# Patient Record
Sex: FEMALE
[De-identification: names, ages, dates, MRNs, and addresses within clinical notes are randomized per-mention and may not be internally consistent; named-entity substitution may affect disease eponyms.]

---

## 2018-03-08 ENCOUNTER — TRANSCRIPTION ENCOUNTER (OUTPATIENT)
Age: 33
End: 2018-03-08

## 2020-06-24 ENCOUNTER — RESULT REVIEW (OUTPATIENT)
Age: 35
End: 2020-06-24

## 2021-04-29 ENCOUNTER — RESULT REVIEW (OUTPATIENT)
Age: 36
End: 2021-04-29

## 2022-08-15 ENCOUNTER — RESULT REVIEW (OUTPATIENT)
Age: 37
End: 2022-08-15

## 2022-11-07 PROBLEM — Z00.00 ENCOUNTER FOR PREVENTIVE HEALTH EXAMINATION: Status: ACTIVE | Noted: 2022-11-07

## 2022-11-08 ENCOUNTER — APPOINTMENT (OUTPATIENT)
Dept: INTERNAL MEDICINE | Facility: CLINIC | Age: 37
End: 2022-11-08
Payer: COMMERCIAL

## 2022-11-08 PROCEDURE — ZZZZZ: CPT

## 2022-11-10 ENCOUNTER — NON-APPOINTMENT (OUTPATIENT)
Age: 37
End: 2022-11-10

## 2022-11-11 ENCOUNTER — APPOINTMENT (OUTPATIENT)
Dept: HEMATOLOGY ONCOLOGY | Facility: CLINIC | Age: 37
End: 2022-11-11

## 2022-11-11 ENCOUNTER — RESULT REVIEW (OUTPATIENT)
Age: 37
End: 2022-11-11

## 2022-11-11 VITALS
OXYGEN SATURATION: 99 % | SYSTOLIC BLOOD PRESSURE: 111 MMHG | BODY MASS INDEX: 24.17 KG/M2 | RESPIRATION RATE: 18 BRPM | TEMPERATURE: 98.2 F | HEART RATE: 75 BPM | DIASTOLIC BLOOD PRESSURE: 73 MMHG | HEIGHT: 67 IN | WEIGHT: 154 LBS

## 2022-11-11 DIAGNOSIS — Z78.9 OTHER SPECIFIED HEALTH STATUS: ICD-10-CM

## 2022-11-11 DIAGNOSIS — Z79.01 LONG TERM (CURRENT) USE OF ANTICOAGULANTS: ICD-10-CM

## 2022-11-11 PROCEDURE — 99205 OFFICE O/P NEW HI 60 MIN: CPT

## 2022-11-11 NOTE — HISTORY OF PRESENT ILLNESS
[de-identified] : 37 year-old Ms. LEWIS is seen in consult for LLE DVT. Patient started having pain in her LLE in early to mid October that improved to some extent ; but early November  she again developed worsening pain and swelling in the same leg that prompted her for medical advise. She was found to have occlusive thrombus in left popliteal and deep calf veins in Doppler ultrasound. She has no past h/o clot, has no h/o pregnancy loss, and has no family h/o clot. Of note, she was on OCP since July 2022. She does not smoke; denies any trauma to LLE.

## 2022-11-11 NOTE — ASSESSMENT
[FreeTextEntry1] : 37 year-old Ms. LEWIS is seen in consult for provoked  acute LLE DVT. Patient was on OCP. This is her first ever DVT. She has no h/o pregnancy loss and has two successful pregnancies. SHe has no family history of clot. She is on Xarelto starter pack now. She will need 3-6 months of anticoagulation. Discussed the risk of recurrence and bleeding risk from AC. \par \par \par [] Acute DVT (Lt popliteal and deep calf veins)\par - Provoked episode, patient was on OCP \par - Patient is on Xarelto\par - Continue Xarelto 20 mg daily for 3 month \par - Will reevaluate in 3 months \par - Will send D-dimer, FVL and PGM today\par - RTC in 3 months \par

## 2022-11-14 LAB
BASOPHILS # BLD AUTO: 0.06 K/UL
BASOPHILS NFR BLD AUTO: 0.9 %
DEPRECATED D DIMER PPP IA-ACNC: 540 NG/ML DDU
EOSINOPHIL # BLD AUTO: 0.33 K/UL
EOSINOPHIL NFR BLD AUTO: 5 %
HCT VFR BLD CALC: 42.3 %
HGB BLD-MCNC: 13.3 G/DL
IMM GRANULOCYTES NFR BLD AUTO: 0.3 %
LYMPHOCYTES # BLD AUTO: 1.88 K/UL
LYMPHOCYTES NFR BLD AUTO: 28.3 %
MAN DIFF?: NORMAL
MCHC RBC-ENTMCNC: 28 PG
MCHC RBC-ENTMCNC: 31.4 GM/DL
MCV RBC AUTO: 89.1 FL
MONOCYTES # BLD AUTO: 0.33 K/UL
MONOCYTES NFR BLD AUTO: 5 %
NEUTROPHILS # BLD AUTO: 4.02 K/UL
NEUTROPHILS NFR BLD AUTO: 60.5 %
PLATELET # BLD AUTO: 234 K/UL
RBC # BLD: 4.75 M/UL
RBC # FLD: 12.7 %
WBC # FLD AUTO: 6.64 K/UL

## 2022-11-18 LAB
DNA PLOIDY SPEC FC-IMP: NORMAL
PTR INTERP: NORMAL

## 2022-12-06 ENCOUNTER — APPOINTMENT (OUTPATIENT)
Dept: INTERNAL MEDICINE | Facility: CLINIC | Age: 37
End: 2022-12-06
Payer: COMMERCIAL

## 2022-12-06 PROCEDURE — 99213 OFFICE O/P EST LOW 20 MIN: CPT

## 2022-12-10 ENCOUNTER — RESULT REVIEW (OUTPATIENT)
Age: 37
End: 2022-12-10

## 2023-02-27 ENCOUNTER — RESULT REVIEW (OUTPATIENT)
Age: 38
End: 2023-02-27

## 2023-02-27 ENCOUNTER — APPOINTMENT (OUTPATIENT)
Dept: HEMATOLOGY ONCOLOGY | Facility: CLINIC | Age: 38
End: 2023-02-27
Payer: COMMERCIAL

## 2023-02-27 ENCOUNTER — NON-APPOINTMENT (OUTPATIENT)
Age: 38
End: 2023-02-27

## 2023-02-27 ENCOUNTER — TRANSCRIPTION ENCOUNTER (OUTPATIENT)
Age: 38
End: 2023-02-27

## 2023-02-27 VITALS
OXYGEN SATURATION: 100 % | WEIGHT: 157 LBS | HEART RATE: 71 BPM | BODY MASS INDEX: 24.64 KG/M2 | HEIGHT: 67 IN | SYSTOLIC BLOOD PRESSURE: 127 MMHG | TEMPERATURE: 98.5 F | DIASTOLIC BLOOD PRESSURE: 71 MMHG | RESPIRATION RATE: 18 BRPM

## 2023-02-27 DIAGNOSIS — I82.629 ACUTE EMBOLISM AND THROMBOSIS OF DEEP VEINS OF UNSPECIFIED UPPER EXTREMITY: ICD-10-CM

## 2023-02-27 DIAGNOSIS — Z80.3 FAMILY HISTORY OF MALIGNANT NEOPLASM OF BREAST: ICD-10-CM

## 2023-02-27 DIAGNOSIS — Z80.7 FAMILY HISTORY OF OTHER MALIGNANT NEOPLASMS OF LYMPHOID, HEMATOPOIETIC AND RELATED TISSUES: ICD-10-CM

## 2023-02-27 DIAGNOSIS — D68.51 ACTIVATED PROTEIN C RESISTANCE: ICD-10-CM

## 2023-02-27 PROCEDURE — 36415 COLL VENOUS BLD VENIPUNCTURE: CPT

## 2023-02-27 PROCEDURE — 99213 OFFICE O/P EST LOW 20 MIN: CPT | Mod: 25

## 2023-02-27 RX ORDER — RIVAROXABAN 20 MG/1
20 TABLET, FILM COATED ORAL
Qty: 30 | Refills: 5 | Status: ACTIVE | COMMUNITY
Start: 2022-11-11 | End: 1900-01-01

## 2023-02-27 NOTE — ASSESSMENT
[FreeTextEntry1] : Patient is 37 year-old is seen in consult for provoked  acute LLE DVT 11/5/2022. Patient was on OCP. This is her first ever DVT. She has no h/o pregnancy loss and has two successful pregnancies. She has no family history of clot. Currently on 20mg Xarelto. She is s/p repeat US 12/11/22 with re-demonstration of L popliteal DVT. \par \par Blood work revealing for Factor V Leiden mutation. Two copies of the factor V Leiden variant, [id number]; p. [id number], were detected.  Homozygous individuals usually have activated protein C resistance and 80 fold increased risk for venous thrombosis.\par \par #Acute DVT (Lt popliteal and deep calf veins)\par - Provoked episode, patient was on OCP  but is factor V leiden homozygous and is at high risk of recurrence\par - Continue Xarelto 20 mg daily --? switch to 10mg after 6 months\par - Discussed counselling 1 st degree relatives. Discussed high risk situations\par \par Health maintenance \par \par RTC 3 months

## 2023-02-27 NOTE — HISTORY OF PRESENT ILLNESS
[de-identified] : 37 year-old Ms. LEWIS is seen in consult for LLE DVT. Patient started having pain in her LLE in early to mid October 2022 that improved to some extent ; but early November  she again developed worsening pain and swelling in the same leg that prompted her for medical advise. \par She was found to have occlusive thrombus in left popliteal and deep calf veins in Doppler ultrasound. \par She has no past h/o clot, has no h/o pregnancy loss, and has no family h/o clot. \par Of note, she was on OCP since July 2022. \par She does not smoke; denies any trauma to LLE.  [de-identified] : Patient is seen today in routine follow up since initial visit and diagnosis of LLE DVT. \par She remains on Xarelto 20mg.

## 2023-03-03 ENCOUNTER — NON-APPOINTMENT (OUTPATIENT)
Age: 38
End: 2023-03-03

## 2023-03-14 ENCOUNTER — APPOINTMENT (OUTPATIENT)
Dept: INTERNAL MEDICINE | Facility: CLINIC | Age: 38
End: 2023-03-14
Payer: COMMERCIAL

## 2023-03-14 PROCEDURE — 99213 OFFICE O/P EST LOW 20 MIN: CPT

## 2023-03-14 PROCEDURE — 87651 STREP A DNA AMP PROBE: CPT | Mod: QW

## 2025-09-05 ENCOUNTER — TRANSCRIPTION ENCOUNTER (OUTPATIENT)
Age: 40
End: 2025-09-05